# Patient Record
(demographics unavailable — no encounter records)

---

## 2025-06-18 NOTE — PHYSICAL EXAM
[de-identified] : Incisions intact. No significant areas of fullness, fluctuance, erythema, or ecchymosis. [de-identified] : Good overall symmetry. Incisions intact. No significant areas of fullness, fluctuance, erythema, or ecchymosis.

## 2025-06-18 NOTE — HISTORY OF PRESENT ILLNESS
[FreeTextEntry1] : Patient returns 9 days following liposuction of bilateral flanks, hips, thighs, and abdomen, fat grafting to waist. Denies any significant issues.

## 2025-06-18 NOTE — REASON FOR VISIT
[FreeTextEntry1] : Dos: 06/10/25; S/P: liposuction of bilateral flanks, hips, thighs, and abdomen, fat grafting to waist

## 2025-06-18 NOTE — PHYSICAL EXAM
[de-identified] : Incisions intact. No significant areas of fullness, fluctuance, erythema, or ecchymosis. [de-identified] : Good overall symmetry. Incisions intact. No significant areas of fullness, fluctuance, erythema, or ecchymosis.

## 2025-07-02 NOTE — PHYSICAL EXAM
[de-identified] : Incisions intact. No significant areas of fullness, fluctuance, or erythema. [de-identified] : Good overall symmetry. Incisions intact. No significant areas of fullness, fluctuance, or erythema.

## 2025-07-02 NOTE — REASON FOR VISIT
[Post Op: _________] : a [unfilled] post op visit [FreeTextEntry1] : Dos: 06/10/25; S/P: liposuction of bilateral flanks, hips, thighs, and abdomen, fat grafting to waist.

## 2025-07-02 NOTE — PHYSICAL EXAM
[de-identified] : Incisions intact. No significant areas of fullness, fluctuance, or erythema. [de-identified] : Good overall symmetry. Incisions intact. No significant areas of fullness, fluctuance, or erythema.

## 2025-07-02 NOTE — HISTORY OF PRESENT ILLNESS
[FreeTextEntry1] : Patient returns 3 weeks following liposuction of bilateral flanks, hips, thighs, and abdomen, fat grafting to waist. Denies any significant issues.

## 2025-07-23 NOTE — PHYSICAL EXAM
[de-identified] : Incisions healing well. No significant areas of fullness, fluctuance, or erythema.   [de-identified] : Good overall symmetry. Incisions intact. No significant areas of fullness, fluctuance, or erythema.

## 2025-07-23 NOTE — PHYSICAL EXAM
[de-identified] : Incisions healing well. No significant areas of fullness, fluctuance, or erythema.   [de-identified] : Good overall symmetry. Incisions intact. No significant areas of fullness, fluctuance, or erythema.

## 2025-07-23 NOTE — HISTORY OF PRESENT ILLNESS
[FreeTextEntry1] : Patient returns 6 weeks following liposuction of bilateral flanks, hips, thighs, and abdomen, fat grafting to waist. Denies any significant issues. He is happy with the results.

## 2025-07-24 NOTE — REASON FOR VISIT
[Post Op: _________] : a [unfilled] post op visit [FreeTextEntry1] : Dos: 06/10/25; S/P: liposuction of bilateral flanks, hips, thighs, and abdomen, fat grafting to waist.  Alert and interactive, no focal deficits

## 2025-07-24 NOTE — PHYSICAL EXAM
[de-identified] : Incisions intact. No significant areas of fullness, fluctuance, or erythema.   [de-identified] : Good overall symmetry. Incisions intact. No significant areas of fullness, fluctuance, or erythema.

## 2025-07-24 NOTE — HISTORY OF PRESENT ILLNESS
[FreeTextEntry1] : The patient returns about 6 weeks following liposuction of bilateral flanks, hips, thighs, and abdomen, fat grafting to waist. Denies any significant issues. States he is happy with the result.

## 2025-07-24 NOTE — PHYSICAL EXAM
[de-identified] : Incisions intact. No significant areas of fullness, fluctuance, or erythema.   [de-identified] : Good overall symmetry. Incisions intact. No significant areas of fullness, fluctuance, or erythema.
